# Patient Record
Sex: FEMALE | Race: WHITE
[De-identification: names, ages, dates, MRNs, and addresses within clinical notes are randomized per-mention and may not be internally consistent; named-entity substitution may affect disease eponyms.]

---

## 2019-12-06 ENCOUNTER — HOSPITAL ENCOUNTER (OUTPATIENT)
Dept: HOSPITAL 8 - CFH | Age: 39
Discharge: HOME | End: 2019-12-06
Attending: NURSE PRACTITIONER
Payer: COMMERCIAL

## 2019-12-06 DIAGNOSIS — K76.0: Primary | ICD-10-CM

## 2019-12-06 DIAGNOSIS — N28.1: ICD-10-CM

## 2019-12-06 DIAGNOSIS — D18.03: ICD-10-CM

## 2019-12-06 DIAGNOSIS — N20.0: ICD-10-CM

## 2019-12-06 PROCEDURE — 74170 CT ABD WO CNTRST FLWD CNTRST: CPT

## 2023-08-16 SDOH — ECONOMIC STABILITY: FOOD INSECURITY: WITHIN THE PAST 12 MONTHS, THE FOOD YOU BOUGHT JUST DIDN'T LAST AND YOU DIDN'T HAVE MONEY TO GET MORE.: NEVER TRUE

## 2023-08-16 SDOH — ECONOMIC STABILITY: TRANSPORTATION INSECURITY
IN THE PAST 12 MONTHS, HAS LACK OF TRANSPORTATION KEPT YOU FROM MEETINGS, WORK, OR FROM GETTING THINGS NEEDED FOR DAILY LIVING?: NO

## 2023-08-16 SDOH — ECONOMIC STABILITY: INCOME INSECURITY: IN THE LAST 12 MONTHS, WAS THERE A TIME WHEN YOU WERE NOT ABLE TO PAY THE MORTGAGE OR RENT ON TIME?: NO

## 2023-08-16 SDOH — ECONOMIC STABILITY: HOUSING INSECURITY
IN THE LAST 12 MONTHS, WAS THERE A TIME WHEN YOU DID NOT HAVE A STEADY PLACE TO SLEEP OR SLEPT IN A SHELTER (INCLUDING NOW)?: NO

## 2023-08-16 SDOH — ECONOMIC STABILITY: FOOD INSECURITY: WITHIN THE PAST 12 MONTHS, YOU WORRIED THAT YOUR FOOD WOULD RUN OUT BEFORE YOU GOT MONEY TO BUY MORE.: NEVER TRUE

## 2023-08-16 SDOH — HEALTH STABILITY: PHYSICAL HEALTH: ON AVERAGE, HOW MANY MINUTES DO YOU ENGAGE IN EXERCISE AT THIS LEVEL?: 60 MIN

## 2023-08-16 SDOH — ECONOMIC STABILITY: INCOME INSECURITY: HOW HARD IS IT FOR YOU TO PAY FOR THE VERY BASICS LIKE FOOD, HOUSING, MEDICAL CARE, AND HEATING?: NOT HARD AT ALL

## 2023-08-16 SDOH — HEALTH STABILITY: MENTAL HEALTH
STRESS IS WHEN SOMEONE FEELS TENSE, NERVOUS, ANXIOUS, OR CAN'T SLEEP AT NIGHT BECAUSE THEIR MIND IS TROUBLED. HOW STRESSED ARE YOU?: ONLY A LITTLE

## 2023-08-16 SDOH — HEALTH STABILITY: PHYSICAL HEALTH: ON AVERAGE, HOW MANY DAYS PER WEEK DO YOU ENGAGE IN MODERATE TO STRENUOUS EXERCISE (LIKE A BRISK WALK)?: 4 DAYS

## 2023-08-16 SDOH — ECONOMIC STABILITY: TRANSPORTATION INSECURITY
IN THE PAST 12 MONTHS, HAS THE LACK OF TRANSPORTATION KEPT YOU FROM MEDICAL APPOINTMENTS OR FROM GETTING MEDICATIONS?: NO

## 2023-08-16 SDOH — ECONOMIC STABILITY: HOUSING INSECURITY: IN THE LAST 12 MONTHS, HOW MANY PLACES HAVE YOU LIVED?: 1

## 2023-08-16 SDOH — ECONOMIC STABILITY: TRANSPORTATION INSECURITY
IN THE PAST 12 MONTHS, HAS LACK OF RELIABLE TRANSPORTATION KEPT YOU FROM MEDICAL APPOINTMENTS, MEETINGS, WORK OR FROM GETTING THINGS NEEDED FOR DAILY LIVING?: NO

## 2023-08-16 ASSESSMENT — SOCIAL DETERMINANTS OF HEALTH (SDOH)
HOW OFTEN DO YOU ATTENT MEETINGS OF THE CLUB OR ORGANIZATION YOU BELONG TO?: 1 TO 4 TIMES PER YEAR
IN A TYPICAL WEEK, HOW MANY TIMES DO YOU TALK ON THE PHONE WITH FAMILY, FRIENDS, OR NEIGHBORS?: THREE TIMES A WEEK
DO YOU BELONG TO ANY CLUBS OR ORGANIZATIONS SUCH AS CHURCH GROUPS UNIONS, FRATERNAL OR ATHLETIC GROUPS, OR SCHOOL GROUPS?: YES
HOW MANY DRINKS CONTAINING ALCOHOL DO YOU HAVE ON A TYPICAL DAY WHEN YOU ARE DRINKING: 1 OR 2
HOW OFTEN DO YOU ATTEND CHURCH OR RELIGIOUS SERVICES?: NEVER
DO YOU BELONG TO ANY CLUBS OR ORGANIZATIONS SUCH AS CHURCH GROUPS UNIONS, FRATERNAL OR ATHLETIC GROUPS, OR SCHOOL GROUPS?: YES
HOW OFTEN DO YOU ATTEND CHURCH OR RELIGIOUS SERVICES?: NEVER
HOW OFTEN DO YOU GET TOGETHER WITH FRIENDS OR RELATIVES?: ONCE A WEEK
HOW HARD IS IT FOR YOU TO PAY FOR THE VERY BASICS LIKE FOOD, HOUSING, MEDICAL CARE, AND HEATING?: NOT HARD AT ALL
HOW OFTEN DO YOU ATTENT MEETINGS OF THE CLUB OR ORGANIZATION YOU BELONG TO?: 1 TO 4 TIMES PER YEAR
ARE YOU MARRIED, WIDOWED, DIVORCED, SEPARATED, NEVER MARRIED, OR LIVING WITH A PARTNER?: NEVER MARRIED
IN A TYPICAL WEEK, HOW MANY TIMES DO YOU TALK ON THE PHONE WITH FAMILY, FRIENDS, OR NEIGHBORS?: THREE TIMES A WEEK
HOW OFTEN DO YOU HAVE SIX OR MORE DRINKS ON ONE OCCASION: NEVER
WITHIN THE PAST 12 MONTHS, YOU WORRIED THAT YOUR FOOD WOULD RUN OUT BEFORE YOU GOT THE MONEY TO BUY MORE: NEVER TRUE
HOW OFTEN DO YOU HAVE A DRINK CONTAINING ALCOHOL: MONTHLY OR LESS
ARE YOU MARRIED, WIDOWED, DIVORCED, SEPARATED, NEVER MARRIED, OR LIVING WITH A PARTNER?: NEVER MARRIED
HOW OFTEN DO YOU GET TOGETHER WITH FRIENDS OR RELATIVES?: ONCE A WEEK

## 2023-08-16 ASSESSMENT — LIFESTYLE VARIABLES
HOW OFTEN DO YOU HAVE SIX OR MORE DRINKS ON ONE OCCASION: NEVER
AUDIT-C TOTAL SCORE: 1
SKIP TO QUESTIONS 9-10: 1
HOW OFTEN DO YOU HAVE A DRINK CONTAINING ALCOHOL: MONTHLY OR LESS
HOW MANY STANDARD DRINKS CONTAINING ALCOHOL DO YOU HAVE ON A TYPICAL DAY: 1 OR 2

## 2023-08-17 ENCOUNTER — OFFICE VISIT (OUTPATIENT)
Dept: MEDICAL GROUP | Facility: CLINIC | Age: 43
End: 2023-08-17
Payer: COMMERCIAL

## 2023-08-17 ENCOUNTER — HOSPITAL ENCOUNTER (OUTPATIENT)
Dept: RADIOLOGY | Facility: MEDICAL CENTER | Age: 43
End: 2023-08-17
Payer: COMMERCIAL

## 2023-08-17 VITALS
WEIGHT: 162 LBS | HEIGHT: 62 IN | SYSTOLIC BLOOD PRESSURE: 88 MMHG | DIASTOLIC BLOOD PRESSURE: 68 MMHG | BODY MASS INDEX: 29.81 KG/M2 | TEMPERATURE: 98 F | HEART RATE: 92 BPM | OXYGEN SATURATION: 99 %

## 2023-08-17 DIAGNOSIS — K76.89 LIVER DYSFUNCTION: ICD-10-CM

## 2023-08-17 DIAGNOSIS — Z76.89 ESTABLISHING CARE WITH NEW DOCTOR, ENCOUNTER FOR: ICD-10-CM

## 2023-08-17 DIAGNOSIS — Z12.31 VISIT FOR SCREENING MAMMOGRAM: ICD-10-CM

## 2023-08-17 DIAGNOSIS — Z92.89 HISTORY OF MAMMOGRAPHY, SCREENING: ICD-10-CM

## 2023-08-17 DIAGNOSIS — Z30.09 BIRTH CONTROL COUNSELING: ICD-10-CM

## 2023-08-17 DIAGNOSIS — Z80.3 FAMILY HISTORY OF BREAST CANCER IN FIRST DEGREE RELATIVE: ICD-10-CM

## 2023-08-17 PROCEDURE — 99204 OFFICE O/P NEW MOD 45 MIN: CPT | Mod: GC

## 2023-08-17 PROCEDURE — 3074F SYST BP LT 130 MM HG: CPT

## 2023-08-17 PROCEDURE — 3078F DIAST BP <80 MM HG: CPT

## 2023-08-17 PROCEDURE — 77063 BREAST TOMOSYNTHESIS BI: CPT

## 2023-08-17 NOTE — ASSESSMENT & PLAN NOTE
Patient previously on hormonal birth control pill prior to her most recent pregnancy in 2019.  States that she experienced side effects with these pills and would not be interested in restarting.  Has expressed interest in a lower dose hormonal birth control method.  Does have hesitancy regarding IUDs.  However, states at this time that she would prefer to schedule a separate appointment to discuss birth control options in more detail.  Not currently sexually active.  Plan:  - Patient may call to schedule appointment to discuss birth control options at her earliest convenience.

## 2023-08-17 NOTE — PROGRESS NOTES
Subjective:     CC: Elevated liver function tests    HISTORY OF THE PRESENT ILLNESS: Patient is a 43 y.o. female. This pleasant patient is here today to establish care and discuss history of elevated liver function tests.     -Elevated liver enzymes: Patient reports history of elevated alkaline phosphatase and GGT over the past several years since her last pregnancy in 2019 (delivery induced at 38 weeks due to elevated liver enzymes and cholangitis). Follows with [facility]. Work-up so far has included liver ultrasound, CT A/P, liver biopsy. Reports a benign lesion noted on the liver from those imaging studies. States Liver U/S showed some fatty tissue but there was none noted in the biopsy. Specialist at the time was concerned for possible portal vein pathology.  On my chart review, most recent lab work from  showed elevated GGT at 313, mildly elevated AST at 45 with normal ALT at 63, Alk phos elevated at 157.denies abdominal pain, nausea, vomiting, history of significant alcohol use, IV drug use, history of hepatitis, jaundice, new itching.    No current [facility]-ordered outpatient medications on file.     No current [facility]-ordered facility-administered medications on file.     PMH: Iron deficiency  PSH:  with first child (). Rotator cuff repair 20 years ago.  Meds: N/A  Allergies: N/A  FamHx: T1DM, Hashimoto's, mother: cervical and breast cancer, leukemia father: CAD, HTN  T: N/A  A: Socially, once a month  D: N/A  Work: Teacher at [employer]  OB/Gyn: past 2 pap smears negative, was positive . 2022 most recent pap smear negative. , first pregnancy ended in miscarriage. First term pregnancy , other two V-vac. Bad reaction to epidural: Significant dizziness, nausea. Regular periods (every 3.5 weeks), no heavy bleeding or pain. Not currently sexually active.  Has been on birth control pill in the past, however did not tolerate side effects.    ROS:  "  Gen: no fevers/chills, no changes in weight  Eyes: no changes in vision  ENT: no changes in hearing  Pulm: no sob, no cough  CV: no chest pain, no palpitations  GI: no nausea/vomiting, no diarrhea  MSk: no myalgias  Skin: no rash  Neuro: no headaches, no numbness/tingling      Objective:     Exam: BP (!) 88/68 (BP Location: Left arm, Patient Position: Sitting, BP Cuff Size: Adult)   Pulse 92   Temp 36.7 °C (98 °F) (Temporal)   Ht 1.575 m (5' 2\")   Wt 73.5 kg (162 lb)   SpO2 99%  Body mass index is 29.63 kg/m².    General: Normal appearing. No distress.  HEENT: Normocephalic. Eyes conjunctiva clear lids without ptosis, pupils equal and reactive to light accommodation, ears normal shape and contour  Pulmonary: Clear to ausculation.  Normal effort. No rales, ronchi, or wheezing.  Cardiovascular: Regular rate and rhythm without murmur. Carotid and radial pulses are intact and equal bilaterally.  Abdomen: Soft, nontender, nondistended. Normal bowel sounds. Liver and spleen are not palpable  Neurologic: Grossly nonfocal  Skin: Warm and dry.  No obvious lesions.  Musculoskeletal: Normal gait. No obvious abnormalities.  No extremity cyanosis, clubbing, or edema.  Psych: Normal mood and affect. Alert and oriented x3. Judgment and insight is normal.    Labs: Most recent lab work from 6/2021 showed CBC within normal limits.  CMP from 2021 showed elevated alk phos, GGT, AST, all others within normal limits.    Assessment & Plan:   43 y.o. female with the following -    Liver dysfunction  Chronic condition, controlled. Patient followed closely by [facility] at [facility] regarding elevated alk phos, GGT.  Extensive work-up has been completed including liver ultrasound, CT abdomen pelvis, liver biopsy thus far with overall benign results according to patient.  Most recent visit was approximately 1 year ago, patient reports Dr. Moser had ordered lab work to be done at that time.  Plan:  - Discussed with patient " that lab work ordered from her GI specialist should still be in active orders as it has not yet been a year since they were ordered.  Also discussed with her that her GI specialist most likely had specific testing that they have ordered and we would defer to them for testing.  Recommended patient to complete lab work ordered by GI doctor and when completed contact their office for an appointment.  -Patient currently asymptomatic, gave return precautions including increased abdominal pain or distention, changes in bowel habits, nausea, vomiting.  - Gave patient information on genetic research study on [employer]    Family history of breast cancer in first degree relative  Patient with family history of breast cancer (her mother).  Has undergone yearly mammograms for surveillance, most recently 5/2022.  So far have been negative.  Plan:  - Gave patient instructions on scheduling mammogram, will forward results to me as her PCP    Birth control counseling  Patient previously on hormonal birth control pill prior to her most recent pregnancy in 2019.  States that she experienced side effects with these pills and would not be interested in restarting.  Has expressed interest in a lower dose hormonal birth control method.  Does have hesitancy regarding IUDs.  However, states at this time that she would prefer to schedule a separate appointment to discuss birth control options in more detail.  Not currently sexually active.  Plan:  - Patient may call to schedule appointment to discuss birth control options at her earliest convenience.    Return in about 1 year (around 8/17/2024) for annual wellness visit.    Genevieve Cornejo M.D.    PGY-1  [facility]

## 2023-08-17 NOTE — ASSESSMENT & PLAN NOTE
Patient with family history of breast cancer (her mother).  Has undergone yearly mammograms for surveillance, most recently 5/2022.  So far have been negative.  Plan:  - Gave patient instructions on scheduling mammogram, will forward results to me as her PCP

## 2023-08-17 NOTE — ASSESSMENT & PLAN NOTE
Chronic condition, controlled. Patient followed closely by Dr. [facility] at [facility] regarding elevated alk phos, GGT.  Extensive work-up has been completed including liver ultrasound, CT abdomen pelvis, liver biopsy thus far with overall benign results according to patient.  Most recent visit was approximately 1 year ago, patient reports Dr. Moser had ordered lab work to be done at that time.  Plan:  - Discussed with patient that lab work ordered from her GI specialist should still be in active orders as it has not yet been a year since they were ordered.  Also discussed with her that her GI specialist most likely had specific testing that they have ordered and we would defer to them for testing.  Recommended patient to complete lab work ordered by GI doctor and when completed contact their office for an appointment.  -Patient currently asymptomatic, gave return precautions including increased abdominal pain or distention, changes in bowel habits, nausea, vomiting.  - Gave patient information on genetic research study on [employer]

## 2023-08-22 ENCOUNTER — HOSPITAL ENCOUNTER (OUTPATIENT)
Dept: RADIOLOGY | Facility: MEDICAL CENTER | Age: 43
End: 2023-08-22
Payer: COMMERCIAL

## 2023-08-22 ENCOUNTER — HOSPITAL ENCOUNTER (OUTPATIENT)
Dept: RADIOLOGY | Facility: MEDICAL CENTER | Age: 43
End: 2023-08-22
Attending: NURSE PRACTITIONER
Payer: COMMERCIAL

## 2023-08-25 ENCOUNTER — RESEARCH ENCOUNTER (OUTPATIENT)
Dept: RESEARCH | Facility: WORKSITE | Age: 43
End: 2023-08-25
Payer: COMMERCIAL

## 2023-08-25 DIAGNOSIS — Z00.6 RESEARCH STUDY PATIENT: ICD-10-CM

## 2023-08-25 NOTE — RESEARCH NOTE
Virtual appointment completed to enroll patient into [facility] [employer]. Consent forms signed and onsite collection has been scheduled.     Confirmed with the participant which designated provider they would like study results shared with Genevieve Cornejo M.D. Patient will have an opportunity to share the results with any providers of their choosing in the future by accessing their results from [email or web address]

## 2023-08-28 ENCOUNTER — HOSPITAL ENCOUNTER (OUTPATIENT)
Dept: LAB | Facility: MEDICAL CENTER | Age: 43
End: 2023-08-28
Payer: COMMERCIAL

## 2023-08-28 DIAGNOSIS — Z00.6 RESEARCH STUDY PATIENT: ICD-10-CM

## 2023-08-31 LAB
ELF SCORE: 7.85 PPM (ref 9.8–11.3)
RELATIVE RISK: NORMAL
RISK GROUP: NORMAL
RISK: 3.3 %

## 2023-10-15 LAB
APOB+LDLR+PCSK9 GENE MUT ANL BLD/T: NOT DETECTED
BRCA1+BRCA2 DEL+DUP + FULL MUT ANL BLD/T: NOT DETECTED
MLH1+MSH2+MSH6+PMS2 GN DEL+DUP+FUL M: NOT DETECTED

## 2024-02-07 ENCOUNTER — OFFICE VISIT (OUTPATIENT)
Dept: URGENT CARE | Facility: CLINIC | Age: 44
End: 2024-02-07
Payer: COMMERCIAL

## 2024-02-07 ENCOUNTER — APPOINTMENT (OUTPATIENT)
Dept: RADIOLOGY | Facility: IMAGING CENTER | Age: 44
End: 2024-02-07
Payer: COMMERCIAL

## 2024-02-07 ENCOUNTER — APPOINTMENT (OUTPATIENT)
Dept: TELEHEALTH | Facility: TELEMEDICINE | Age: 44
End: 2024-02-07
Payer: COMMERCIAL

## 2024-02-07 VITALS
DIASTOLIC BLOOD PRESSURE: 74 MMHG | SYSTOLIC BLOOD PRESSURE: 124 MMHG | WEIGHT: 160.4 LBS | BODY MASS INDEX: 29.52 KG/M2 | HEART RATE: 91 BPM | RESPIRATION RATE: 20 BRPM | TEMPERATURE: 98.2 F | OXYGEN SATURATION: 100 % | HEIGHT: 62 IN

## 2024-02-07 DIAGNOSIS — M25.511 ACUTE PAIN OF RIGHT SHOULDER: ICD-10-CM

## 2024-02-07 DIAGNOSIS — R07.81 PLEURITIC PAIN: ICD-10-CM

## 2024-02-07 DIAGNOSIS — S46.911A STRAIN OF RIGHT SHOULDER, INITIAL ENCOUNTER: ICD-10-CM

## 2024-02-07 DIAGNOSIS — S46.919A: ICD-10-CM

## 2024-02-07 PROCEDURE — 71046 X-RAY EXAM CHEST 2 VIEWS: CPT | Mod: TC | Performed by: RADIOLOGY

## 2024-02-07 PROCEDURE — 3074F SYST BP LT 130 MM HG: CPT

## 2024-02-07 PROCEDURE — 73030 X-RAY EXAM OF SHOULDER: CPT | Mod: TC,RT | Performed by: RADIOLOGY

## 2024-02-07 PROCEDURE — 99203 OFFICE O/P NEW LOW 30 MIN: CPT

## 2024-02-07 PROCEDURE — 3078F DIAST BP <80 MM HG: CPT

## 2024-02-07 RX ORDER — MELOXICAM 15 MG/1
15 TABLET ORAL DAILY
Qty: 30 TABLET | Refills: 0 | Status: SHIPPED | OUTPATIENT
Start: 2024-02-07

## 2024-02-09 ASSESSMENT — ENCOUNTER SYMPTOMS
FEVER: 0
TINGLING: 0
DIZZINESS: 0
SENSORY CHANGE: 0
SHORTNESS OF BREATH: 0
FALLS: 0
VOMITING: 0
WEAKNESS: 0
NAUSEA: 0
NECK PAIN: 1

## 2024-02-09 NOTE — PROGRESS NOTES
"Subjective     Rubi Powell is a 43 y.o. female who presents with neck stiffness, right shoulder blade and shoulder pain x4 days.     HPI:   Rubi is a 44yo female presenting for right shoulder blade pain with associated right-sided neck stiffness and right shoulder pain x4 days. Reports a prior dislocated rib which presented with pain similar in nature to what she is currently experiencing. Reports prior right shoulder surgery. Denies recent injury. Pain is constant and tight in nature, aggravate by movement. Denies numbness/tingling or sensation loss in right upper extremity. No swelling or discoloration. Reports tenderness to palpation of right scapular region. She has full ROM of the neck and right shoulder. No lumbar, elbow, or wrist involvement. Denies shortness of breath or airway involvement. No fever.       Review of Systems   Constitutional:  Negative for fever.   Respiratory:  Negative for shortness of breath.    Cardiovascular:  Negative for chest pain.   Gastrointestinal:  Negative for nausea and vomiting.   Musculoskeletal:  Positive for joint pain and neck pain. Negative for falls.   Neurological:  Negative for dizziness, tingling, sensory change and weakness.     History reviewed. No pertinent past medical history.     History reviewed. No pertinent surgical history.     Allergies: Penicillins     Medications, Allergies, and current problem list reviewed today in Epic.      Objective     /74 (BP Location: Left arm, Patient Position: Sitting)   Pulse 91   Temp 36.8 °C (98.2 °F) (Temporal)   Resp 20   Ht 1.575 m (5' 2\")   Wt 72.8 kg (160 lb 6.4 oz)   LMP 01/07/2024 (Exact Date)   SpO2 100%   BMI 29.34 kg/m²      Physical Exam  Vitals reviewed.   Constitutional:       General: She is not in acute distress.  Eyes:      Extraocular Movements: Extraocular movements intact.      Conjunctiva/sclera: Conjunctivae normal.      Pupils: Pupils are equal, round, and reactive to light.   Neck:      " Trachea: Trachea normal.      Meningeal: Brudzinski's sign and Kernig's sign absent.      Comments: Tenderness to palpation to posterior right cervical musculature.   Strength 5/5 to bilateral upper extremities.   No midline tenderness.       Cervical ROM intact.    Cardiovascular:      Rate and Rhythm: Normal rate.      Pulses: Normal pulses.           Radial pulses are 2+ on the right side and 2+ on the left side.   Pulmonary:      Effort: Pulmonary effort is normal. No tachypnea, accessory muscle usage, prolonged expiration, respiratory distress or retractions.   Musculoskeletal:      Right shoulder: Tenderness present. No swelling, deformity, bony tenderness or crepitus. Normal range of motion. Normal strength. Normal pulse.      Left shoulder: Normal.      Right upper arm: No swelling, deformity, tenderness or bony tenderness.      Left upper arm: Normal.      Right elbow: No swelling or deformity. Normal range of motion. No tenderness.      Left elbow: Normal.      Right forearm: No swelling, deformity, tenderness or bony tenderness.      Left forearm: Normal.      Right wrist: No swelling, deformity, tenderness, bony tenderness, snuff box tenderness or crepitus. Normal range of motion. Normal pulse.      Left wrist: Normal.      Cervical back: Normal range of motion and neck supple. No erythema, rigidity or crepitus. Pain with movement and muscular tenderness present. No spinous process tenderness. Normal range of motion.      Thoracic back: No deformity or bony tenderness. Normal range of motion.      Lumbar back: No deformity, tenderness or bony tenderness. Normal range of motion.      Comments: Tenderness to palpation to right scapular region. No crepitus. No obvious rib misalignment with palpation.       Skin:     General: Skin is warm and dry.   Neurological:      Mental Status: She is alert. Mental status is at baseline.   Psychiatric:         Mood and Affect: Mood normal.         Behavior: Behavior  normal.         Thought Content: Thought content normal.       DX-CHEST-2 VIEWS    Result Date: 2/7/2024 2/7/2024 4:29 PM HISTORY/REASON FOR EXAM:  RIGHT chest/shoulder pain. TECHNIQUE/EXAM DESCRIPTION AND NUMBER OF VIEWS: Two views of the chest. COMPARISON:  None FINDINGS: Cardiomediastinal contour is within normal limits. No focal pulmonary consolidation. No pleural fluid collection or pneumothorax. No major bony abnormality is seen. Postoperative change of RIGHT shoulder.     No acute cardiopulmonary disease.    DX-SHOULDER 2+ RIGHT    Result Date: 2/7/2024  2/7/2024 4:29 PM HISTORY/REASON FOR EXAM:  Atraumatic Pain/Swelling/Deformity. RIGHT scapular pain. TECHNIQUE/EXAM DESCRIPTION AND NUMBER OF VIEWS:  3 views of the RIGHT shoulder. COMPARISON: None FINDINGS: Clavicle is intact.  AC joint is preserved. Visualized proximal humerus is intact and normally located. Postoperative change of the glenoid with multiple suture anchors present. Visualized RIGHT chest is unremarkable.     1.  No fracture or dislocation of RIGHT shoulder. 2.  Postoperative change suggesting prior rotator cuff repair.      Assessment & Plan     1. Pleuritic pain   - DX-CHEST-2 VIEWS; Future    2. Acute pain of right shoulder   - DX-SHOULDER 2+ RIGHT; Future    3. Muscle strain of scapular region, initial encounter   - meloxicam (MOBIC) 15 MG tablet; Take 1 Tablet by mouth every day.  Dispense: 30 Tablet; Refill: 0       MDM/Comments:   History and examination with reproducible tenderness to palpation of posterior right cervical musculature and right scapular region consistent with acute strain of neck muscle, muscle strain of scapular region, and strain of right shoulder. No red flag/alarm features present that warrant immediate higher level of care or further work up. Pulmonary examination without abnormality, no dyspnea or shortness of breath. Chest x-ray without major bony abnormality or pneumothorax. Shoulder x-ray without evidence of  fracture or dislocation.             Discussed with patient signs and symptoms consistent with muscle strain. Patient advised on how to correctly take meloxicam. No additional NSAIDs while taking this medication. May use Tylenol for breakthrough pain.   Treatment of as above and initial rest with no heavy lifting, stooping, or strenuous activity. Massage, ice and/or heat which ever feels better. Encouraged walking, stretching, and range of motion exercises as tolerated. Avoid sitting or laying down for long periods of time except for at night during sleep.   Patient is overall very well-appearing, no acute distress, and normal vital signs. Suspicions for acute emergent pathology are low.      Illness progression and alarm symptoms discussed with patient, emphasizing low threshold for returning to clinic/emergency department for worsening symptoms. Patient is agreeable to the plan and verbalizes understanding, and will follow up if warranted.        Differential diagnosis, natural history, supportive care, and indications for immediate follow-up discussed.       Follow-up as needed if symptoms worsen or fail to improve to PCP, Urgent care or Emergency Room.                      Electronically signed by KVNG Tobin

## 2024-03-06 ENCOUNTER — APPOINTMENT (OUTPATIENT)
Dept: MEDICAL GROUP | Facility: CLINIC | Age: 44
End: 2024-03-06
Payer: COMMERCIAL

## 2024-05-29 ENCOUNTER — RESEARCH ENCOUNTER (OUTPATIENT)
Dept: RESEARCH | Facility: MEDICAL CENTER | Age: 44
End: 2024-05-29
Payer: COMMERCIAL

## 2024-09-14 SDOH — ECONOMIC STABILITY: FOOD INSECURITY: WITHIN THE PAST 12 MONTHS, YOU WORRIED THAT YOUR FOOD WOULD RUN OUT BEFORE YOU GOT MONEY TO BUY MORE.: NEVER TRUE

## 2024-09-14 SDOH — ECONOMIC STABILITY: INCOME INSECURITY: IN THE LAST 12 MONTHS, WAS THERE A TIME WHEN YOU WERE NOT ABLE TO PAY THE MORTGAGE OR RENT ON TIME?: NO

## 2024-09-14 SDOH — ECONOMIC STABILITY: INCOME INSECURITY: HOW HARD IS IT FOR YOU TO PAY FOR THE VERY BASICS LIKE FOOD, HOUSING, MEDICAL CARE, AND HEATING?: NOT HARD AT ALL

## 2024-09-14 SDOH — ECONOMIC STABILITY: FOOD INSECURITY: WITHIN THE PAST 12 MONTHS, THE FOOD YOU BOUGHT JUST DIDN'T LAST AND YOU DIDN'T HAVE MONEY TO GET MORE.: NEVER TRUE

## 2024-09-14 SDOH — HEALTH STABILITY: PHYSICAL HEALTH: ON AVERAGE, HOW MANY MINUTES DO YOU ENGAGE IN EXERCISE AT THIS LEVEL?: 10 MIN

## 2024-09-14 SDOH — HEALTH STABILITY: PHYSICAL HEALTH: ON AVERAGE, HOW MANY DAYS PER WEEK DO YOU ENGAGE IN MODERATE TO STRENUOUS EXERCISE (LIKE A BRISK WALK)?: 3 DAYS

## 2024-09-14 ASSESSMENT — SOCIAL DETERMINANTS OF HEALTH (SDOH)
DO YOU BELONG TO ANY CLUBS OR ORGANIZATIONS SUCH AS CHURCH GROUPS UNIONS, FRATERNAL OR ATHLETIC GROUPS, OR SCHOOL GROUPS?: YES
HOW OFTEN DO YOU GET TOGETHER WITH FRIENDS OR RELATIVES?: MORE THAN THREE TIMES A WEEK
HOW OFTEN DO YOU ATTEND CHURCH OR RELIGIOUS SERVICES?: NEVER
IN A TYPICAL WEEK, HOW MANY TIMES DO YOU TALK ON THE PHONE WITH FAMILY, FRIENDS, OR NEIGHBORS?: TWICE A WEEK
HOW OFTEN DO YOU ATTENT MEETINGS OF THE CLUB OR ORGANIZATION YOU BELONG TO?: MORE THAN 4 TIMES PER YEAR
ARE YOU MARRIED, WIDOWED, DIVORCED, SEPARATED, NEVER MARRIED, OR LIVING WITH A PARTNER?: NEVER MARRIED
WITHIN THE PAST 12 MONTHS, YOU WORRIED THAT YOUR FOOD WOULD RUN OUT BEFORE YOU GOT THE MONEY TO BUY MORE: NEVER TRUE
HOW MANY DRINKS CONTAINING ALCOHOL DO YOU HAVE ON A TYPICAL DAY WHEN YOU ARE DRINKING: 1 OR 2
DO YOU BELONG TO ANY CLUBS OR ORGANIZATIONS SUCH AS CHURCH GROUPS UNIONS, FRATERNAL OR ATHLETIC GROUPS, OR SCHOOL GROUPS?: YES
IN THE PAST 12 MONTHS, HAS THE ELECTRIC, GAS, OIL, OR WATER COMPANY THREATENED TO SHUT OFF SERVICE IN YOUR HOME?: NO
HOW OFTEN DO YOU HAVE A DRINK CONTAINING ALCOHOL: 2-4 TIMES A MONTH
IN A TYPICAL WEEK, HOW MANY TIMES DO YOU TALK ON THE PHONE WITH FAMILY, FRIENDS, OR NEIGHBORS?: TWICE A WEEK
HOW OFTEN DO YOU ATTEND CHURCH OR RELIGIOUS SERVICES?: NEVER
HOW OFTEN DO YOU GET TOGETHER WITH FRIENDS OR RELATIVES?: MORE THAN THREE TIMES A WEEK
HOW HARD IS IT FOR YOU TO PAY FOR THE VERY BASICS LIKE FOOD, HOUSING, MEDICAL CARE, AND HEATING?: NOT HARD AT ALL
HOW OFTEN DO YOU HAVE SIX OR MORE DRINKS ON ONE OCCASION: NEVER
HOW OFTEN DO YOU ATTENT MEETINGS OF THE CLUB OR ORGANIZATION YOU BELONG TO?: MORE THAN 4 TIMES PER YEAR
ARE YOU MARRIED, WIDOWED, DIVORCED, SEPARATED, NEVER MARRIED, OR LIVING WITH A PARTNER?: NEVER MARRIED

## 2024-09-14 ASSESSMENT — LIFESTYLE VARIABLES
HOW OFTEN DO YOU HAVE SIX OR MORE DRINKS ON ONE OCCASION: NEVER
SKIP TO QUESTIONS 9-10: 1
AUDIT-C TOTAL SCORE: 2
HOW MANY STANDARD DRINKS CONTAINING ALCOHOL DO YOU HAVE ON A TYPICAL DAY: 1 OR 2
HOW OFTEN DO YOU HAVE A DRINK CONTAINING ALCOHOL: 2-4 TIMES A MONTH

## 2024-09-17 ENCOUNTER — HOSPITAL ENCOUNTER (OUTPATIENT)
Dept: RADIOLOGY | Facility: MEDICAL CENTER | Age: 44
End: 2024-09-17
Attending: NURSE PRACTITIONER
Payer: COMMERCIAL

## 2024-09-17 DIAGNOSIS — Z12.31 VISIT FOR SCREENING MAMMOGRAM: ICD-10-CM

## 2024-09-17 PROCEDURE — 77067 SCR MAMMO BI INCL CAD: CPT

## 2024-09-18 ENCOUNTER — OFFICE VISIT (OUTPATIENT)
Dept: MEDICAL GROUP | Facility: MEDICAL CENTER | Age: 44
End: 2024-09-18
Payer: COMMERCIAL

## 2024-09-18 VITALS
HEART RATE: 99 BPM | SYSTOLIC BLOOD PRESSURE: 122 MMHG | TEMPERATURE: 98.6 F | WEIGHT: 164 LBS | DIASTOLIC BLOOD PRESSURE: 68 MMHG | BODY MASS INDEX: 30.18 KG/M2 | OXYGEN SATURATION: 99 % | HEIGHT: 62 IN

## 2024-09-18 DIAGNOSIS — Z11.4 SCREENING FOR HIV (HUMAN IMMUNODEFICIENCY VIRUS): ICD-10-CM

## 2024-09-18 DIAGNOSIS — Z23 NEED FOR VACCINATION: ICD-10-CM

## 2024-09-18 DIAGNOSIS — Z30.09 BIRTH CONTROL COUNSELING: ICD-10-CM

## 2024-09-18 DIAGNOSIS — Z30.9 ENCOUNTER FOR CONTRACEPTIVE MANAGEMENT, UNSPECIFIED TYPE: ICD-10-CM

## 2024-09-18 DIAGNOSIS — Z00.00 PE (PHYSICAL EXAM), ANNUAL: ICD-10-CM

## 2024-09-18 DIAGNOSIS — Z11.3 ROUTINE SCREENING FOR STI (SEXUALLY TRANSMITTED INFECTION): ICD-10-CM

## 2024-09-18 DIAGNOSIS — R74.8 ELEVATED LIVER ENZYMES: ICD-10-CM

## 2024-09-18 DIAGNOSIS — Z11.59 NEED FOR HEPATITIS C SCREENING TEST: ICD-10-CM

## 2024-09-18 LAB
POCT INT CON NEG: NEGATIVE
POCT INT CON POS: POSITIVE
POCT URINE PREGNANCY TEST: NEGATIVE

## 2024-09-18 PROCEDURE — 3078F DIAST BP <80 MM HG: CPT | Performed by: NURSE PRACTITIONER

## 2024-09-18 PROCEDURE — 99214 OFFICE O/P EST MOD 30 MIN: CPT | Mod: 25 | Performed by: NURSE PRACTITIONER

## 2024-09-18 PROCEDURE — 81025 URINE PREGNANCY TEST: CPT | Performed by: NURSE PRACTITIONER

## 2024-09-18 PROCEDURE — 99396 PREV VISIT EST AGE 40-64: CPT | Mod: 25 | Performed by: NURSE PRACTITIONER

## 2024-09-18 PROCEDURE — 90715 TDAP VACCINE 7 YRS/> IM: CPT | Performed by: NURSE PRACTITIONER

## 2024-09-18 PROCEDURE — 90471 IMMUNIZATION ADMIN: CPT | Performed by: NURSE PRACTITIONER

## 2024-09-18 PROCEDURE — 3074F SYST BP LT 130 MM HG: CPT | Performed by: NURSE PRACTITIONER

## 2024-09-18 RX ORDER — LEVONORGESTREL AND ETHINYL ESTRADIOL 90; 20 UG/1; UG/1
1 TABLET ORAL DAILY
Qty: 90 TABLET | Refills: 0 | Status: SHIPPED | OUTPATIENT
Start: 2024-09-18

## 2024-09-18 ASSESSMENT — ENCOUNTER SYMPTOMS
WEIGHT LOSS: 0
EYE PAIN: 0
HEADACHES: 0
INSOMNIA: 0
TREMORS: 0
SPUTUM PRODUCTION: 0
BRUISES/BLEEDS EASILY: 0
FLANK PAIN: 0
BLOOD IN STOOL: 0
COUGH: 0
WEAKNESS: 0
SENSORY CHANGE: 0
DEPRESSION: 0
SPEECH CHANGE: 0
WHEEZING: 0
SHORTNESS OF BREATH: 0
SORE THROAT: 0
PALPITATIONS: 0
SINUS PAIN: 0
BACK PAIN: 0
VOMITING: 0
DIZZINESS: 0
CONSTIPATION: 0
ABDOMINAL PAIN: 0
NERVOUS/ANXIOUS: 0
POLYDIPSIA: 0
FEVER: 0
DIARRHEA: 0
FOCAL WEAKNESS: 0
EYE DISCHARGE: 0
EYE REDNESS: 0
LOSS OF CONSCIOUSNESS: 0
NAUSEA: 0
MYALGIAS: 0
CHILLS: 0

## 2024-09-18 ASSESSMENT — PATIENT HEALTH QUESTIONNAIRE - PHQ9: CLINICAL INTERPRETATION OF PHQ2 SCORE: 0

## 2024-09-18 NOTE — PROGRESS NOTES
Patient agreed to using ERIK: yes    Rubi was seen today for establish care.    Diagnoses and all orders for this visit:    Elevated liver enzymes    Encounter for contraceptive management, unspecified type  -     Referral to OB/Gyn  -     levonorgestrel-ethinyl estradiol (LYBREL) 90-20 MCG per tablet; Take 1 Tablet by mouth every day.  -     POCT Pregnancy    Birth control counseling  -     Referral to OB/Gyn  -     POCT Pregnancy  -     ESTRADIOL; Future  -     LUTEINIZING HORMONE SERUM; Future  -     PROGESTERONE; Future  -     PROLACTIN; Future  -     FSH; Future    PE (physical exam), annual  -     CBC WITHOUT DIFFERENTIAL; Future  -     Comp Metabolic Panel; Future  -     Lipid Profile; Future  -     TSH; Future  -     T3 FREE; Future  -     FREE THYROXINE; Future  -     VITAMIN D,25 HYDROXY (DEFICIENCY); Future  -     HEMOGLOBIN A1C; Future    Screening for HIV (human immunodeficiency virus)  -     HIV AG/AB COMBO ASSAY SCREENING; Future    Need for hepatitis C screening test  -     HEP C VIRUS ANTIBODY; Future    Need for vaccination  -     Tdap Vaccine =>8YO IM    Routine screening for STI (sexually transmitted infection)  -     Chlamydia/GC, PCR (Urine); Future              Assessment & Plan  1. Establishment of care.  2. Annual physical exam  Her vital signs are within normal limits.   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 She is advised to increase her electrolyte and water intake.    3. Birth control  She has expressed interest in Gisela birth control pills. A prescription for Gisela birth control pills will be provided for a 3-month trial period. A pregnancy test will be conducted today. A referral to OB/GYN will be made for a birth control consultation. If she decides to continue with the current birth control method and is satisfied with the medication, it will be continued. If she prefers the options offered by the OB/GYN,  they will initiate the treatment.     4. Family history of Hashimoto's.  Given her family history of Hashimoto's, a thyroid function test will be ordered.    5. Elevated liver enzymes.  Per pt history, her liver enzymes have remained stable, and there is no concern from the GI perspective. She does not exhibit any symptoms. A liver function test will be ordered to monitor her liver enzymes.    6. Health Maintenance.  Her recent mammogram on 09/17/2024 was benign. She has not received the tetanus vaccine; it will be administered today. Colon cancer screening will be discussed at her next annual visit. Tests for chlamydia and gonorrhea will be conducted.     Follow-up  She will follow up in 4 to 6 weeks.    History of Present Illness  The patient presents for birth control and to establish care.    She is considering birth control as an alternative to hormone therapy as she approaches menopause. She has previously used estrogen and progesterone pills effectively for birth control and switched to progestin-only pills after childbirth. She has no contraindications to estrogen and has used estrogen birth control in the past without issues. She is interested in a daily pill that could potentially eliminate her periods for a year. She is not interested in an IUD due to concerns about potential uterine damage. Her menstrual cycles are regular. Her last Pap smear was normal, following a previous abnormal result.    She underwent a mammogram yesterday, which showed no abnormalities. Despite her mother's history of breast and cervical cancer, genetic testing revealed no inherited risk.    She reports no shortness of breath, dizziness, or blood in stool or urine. 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 She has tried intermittent fasting but stopped due to lightheadedness, which she attributes to dehydration. She reports no changes in vision and tries to use sunscreen regularly. She has  no concerns about sexually transmitted infections. She has not had a urinary tract infection in the past 6 months. She had a heart murmur as a child but has not experienced it since. She does not have difficulty swallowing, acid reflux, epigastric pain, metallic taste in the mouth, or esophageal pain.    She has been seen at urgent care for elevated liver enzymes, specifically GGT and another enzyme. A liver biopsy and genetic testing for CLEMONS were both normal. She has been under the care of a gastroenterologist and has no symptoms of jaundice, pain, or digestive issues. The cause of her elevated liver enzymes remains unknown. She was advised to lose weight and reduce carbohydrate intake. A biopsy confirmed that she does not have fatty liver disease.    She had shoulder surgery at age 22 due to a rotator cuff injury where the ligament was pulled off the cuff area. She has also had wisdom teeth extraction and a .    SOCIAL HISTORY  She denies smoking, alcohol, or drug use. 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    FAMILY HISTORY  Her mother had breast cancer and cervical cancer. Her father and both grandmothers had high blood pressure. Her father had a heart attack and has cardiac issues. Her sister has Hashimoto's. Two of her nephews have celiac and one nephew has diabetes. Her father has sleep apnea.    IMMUNIZATIONS  She has received the first 2 COVID-19 vaccines but has not done the boosters. She does not usually get the influenza vaccine. She is up-to-date on other vaccines except tetanus.       Ob-Gyn/ History:    Last Pap Smear:  .   Health Maintenance  Below Anticipatory guidance discussed with patient  Cholesterol Screening: labs  Diabetes Screening: labs  Aspirin Use: no    Diet: regular   Exercise: active   Substance Abuse: no  Safe in relationship.   Seat belts, bike helmet, gun safety discussed.  Sun protection used.    Cancer screening  Cervical Cancer  Screenin    Breast Cancer Screenin24    Infectious disease screening/Immunizations  --STI Screening: UA, labs   --Practices safe sex.  --HIV Screening: labs   --Hepatitis C Screening: labs   --Immunizations: Tdap     Review of Systems   Constitutional:  Negative for chills, fever, malaise/fatigue and weight loss.   HENT:  Negative for congestion, ear discharge, hearing loss, sinus pain and sore throat.    Eyes:  Negative for pain, discharge and redness.   Respiratory:  Negative for cough, sputum production, shortness of breath and wheezing.    Cardiovascular:  Negative for chest pain, palpitations and leg swelling.   Gastrointestinal:  Negative for abdominal pain, blood in stool, constipation, diarrhea, nausea and vomiting.   Genitourinary:  Negative for dysuria, flank pain, frequency, hematuria and urgency.   Musculoskeletal:  Negative for back pain, joint pain and myalgias.   Skin:  Negative for itching and rash.   Neurological:  Negative for dizziness, tremors, sensory change, speech change, focal weakness, loss of consciousness, weakness and headaches.   Endo/Heme/Allergies:  Negative for environmental allergies and polydipsia. Does not bruise/bleed easily.   Psychiatric/Behavioral:  Negative for depression. The patient is not nervous/anxious and does not have insomnia.         He  has no past medical history on file.  He  has a past surgical history that includes primary c section and arthroplasty.  Family History   Problem Relation Age of Onset    Heart Disease Father     Hypertension Father      Social History     Tobacco Use    Smoking status: Never    Smokeless tobacco: Never   Substance Use Topics    Alcohol use: Yes     Comment: once a month    Drug use: Never     Patient Active Problem List    Diagnosis Date Noted    Elevated liver enzymes 2024    Liver dysfunction 2023    Establishing care with new doctor, encounter for 2023    Family history of breast cancer in first  "degree relative 08/17/2023    Birth control counseling 08/17/2023     Current Outpatient Medications   Medication Sig Dispense Refill    levonorgestrel-ethinyl estradiol (LYBREL) 90-20 MCG per tablet Take 1 Tablet by mouth every day. 90 Tablet 0    meloxicam (MOBIC) 15 MG tablet Take 1 Tablet by mouth every day. 30 Tablet 0     No current facility-administered medications for this visit.    (including changes today)  Allergies: Penicillins    /68 (BP Location: Left arm, Patient Position: Sitting, BP Cuff Size: Adult)   Pulse 99   Temp 37 °C (98.6 °F) (Temporal)   Ht 1.575 m (5' 2\")   Wt 74.4 kg (164 lb)   SpO2 99%      Physical Exam  Constitutional:       General: She is not in acute distress.     Appearance: Normal appearance. She is normal weight. She is not ill-appearing.   HENT:      Head: Normocephalic and atraumatic.      Right Ear: Tympanic membrane, ear canal and external ear normal. There is no impacted cerumen.      Left Ear: Tympanic membrane, ear canal and external ear normal. There is no impacted cerumen.      Nose: Nose normal. No congestion or rhinorrhea.      Mouth/Throat:      Mouth: Mucous membranes are moist.      Pharynx: No oropharyngeal exudate or posterior oropharyngeal erythema.   Eyes:      General:         Right eye: No discharge.         Left eye: No discharge.      Pupils: Pupils are equal, round, and reactive to light.   Cardiovascular:      Rate and Rhythm: Normal rate.      Pulses: Normal pulses.      Heart sounds: Normal heart sounds. No murmur heard.     No friction rub. No gallop.   Pulmonary:      Effort: Pulmonary effort is normal. No respiratory distress.      Breath sounds: Normal breath sounds. No wheezing, rhonchi or rales.   Abdominal:      General: Bowel sounds are normal. There is no distension.      Palpations: Abdomen is soft. There is no mass.      Tenderness: There is no abdominal tenderness. There is no right CVA tenderness, left CVA tenderness, guarding or " rebound.      Hernia: No hernia is present.   Musculoskeletal:         General: No swelling, tenderness or deformity. Normal range of motion.      Cervical back: Normal range of motion and neck supple.      Right lower leg: No edema.      Left lower leg: No edema.   Lymphadenopathy:      Cervical: No cervical adenopathy.   Skin:     General: Skin is warm.      Findings: No rash.   Neurological:      General: No focal deficit present.      Mental Status: She is alert. Mental status is at baseline.      Cranial Nerves: No cranial nerve deficit.      Sensory: No sensory deficit.      Motor: No weakness.      Coordination: Coordination normal.      Gait: Gait normal.   Psychiatric:         Mood and Affect: Mood normal.         Behavior: Behavior normal.          Results  Laboratory Studies  Liver enzymes were high, but liver biopsy came back fine. Genetic testing for CLEMONS also came back healthy.    Imaging  Mammogram results were benign.       No follow-ups on file. 8 wks

## 2024-09-27 ENCOUNTER — HOSPITAL ENCOUNTER (OUTPATIENT)
Dept: LAB | Facility: MEDICAL CENTER | Age: 44
End: 2024-09-27
Attending: NURSE PRACTITIONER
Payer: COMMERCIAL

## 2024-09-27 DIAGNOSIS — Z00.00 PE (PHYSICAL EXAM), ANNUAL: ICD-10-CM

## 2024-09-27 DIAGNOSIS — Z11.3 ROUTINE SCREENING FOR STI (SEXUALLY TRANSMITTED INFECTION): ICD-10-CM

## 2024-09-27 DIAGNOSIS — Z11.4 SCREENING FOR HIV (HUMAN IMMUNODEFICIENCY VIRUS): ICD-10-CM

## 2024-09-27 DIAGNOSIS — Z11.59 NEED FOR HEPATITIS C SCREENING TEST: ICD-10-CM

## 2024-09-27 DIAGNOSIS — Z30.09 BIRTH CONTROL COUNSELING: ICD-10-CM

## 2024-09-27 LAB
25(OH)D3 SERPL-MCNC: 24 NG/ML (ref 30–100)
ALBUMIN SERPL BCP-MCNC: 4.3 G/DL (ref 3.2–4.9)
ALBUMIN/GLOB SERPL: 1.5 G/DL
ALP SERPL-CCNC: 158 U/L (ref 30–99)
ALT SERPL-CCNC: 47 U/L (ref 2–50)
ANION GAP SERPL CALC-SCNC: 11 MMOL/L (ref 7–16)
AST SERPL-CCNC: 33 U/L (ref 12–45)
BILIRUB SERPL-MCNC: 0.4 MG/DL (ref 0.1–1.5)
BUN SERPL-MCNC: 12 MG/DL (ref 8–22)
CALCIUM ALBUM COR SERPL-MCNC: 8.6 MG/DL (ref 8.5–10.5)
CALCIUM SERPL-MCNC: 8.8 MG/DL (ref 8.4–10.2)
CHLORIDE SERPL-SCNC: 104 MMOL/L (ref 96–112)
CHOLEST SERPL-MCNC: 167 MG/DL (ref 100–199)
CO2 SERPL-SCNC: 24 MMOL/L (ref 20–33)
CREAT SERPL-MCNC: 0.75 MG/DL (ref 0.5–1.4)
ERYTHROCYTE [DISTWIDTH] IN BLOOD BY AUTOMATED COUNT: 41.1 FL (ref 35.9–50)
EST. AVERAGE GLUCOSE BLD GHB EST-MCNC: 77 MG/DL
GFR SERPLBLD CREATININE-BSD FMLA CKD-EPI: 100 ML/MIN/1.73 M 2
GLOBULIN SER CALC-MCNC: 2.9 G/DL (ref 1.9–3.5)
GLUCOSE SERPL-MCNC: 86 MG/DL (ref 65–99)
HBA1C MFR BLD: 4.3 % (ref 4–5.6)
HCT VFR BLD AUTO: 40 % (ref 37–47)
HDLC SERPL-MCNC: 63 MG/DL
HGB BLD-MCNC: 13.7 G/DL (ref 12–16)
LDLC SERPL CALC-MCNC: 91 MG/DL
MCH RBC QN AUTO: 32.5 PG (ref 27–33)
MCHC RBC AUTO-ENTMCNC: 34.3 G/DL (ref 32.2–35.5)
MCV RBC AUTO: 95 FL (ref 81.4–97.8)
PLATELET # BLD AUTO: 297 K/UL (ref 164–446)
PMV BLD AUTO: 10.2 FL (ref 9–12.9)
POTASSIUM SERPL-SCNC: 4.1 MMOL/L (ref 3.6–5.5)
PROT SERPL-MCNC: 7.2 G/DL (ref 6–8.2)
RBC # BLD AUTO: 4.21 M/UL (ref 4.2–5.4)
SODIUM SERPL-SCNC: 139 MMOL/L (ref 135–145)
T4 FREE SERPL-MCNC: 1.2 NG/DL (ref 0.93–1.7)
TRIGL SERPL-MCNC: 63 MG/DL (ref 0–149)
TSH SERPL DL<=0.005 MIU/L-ACNC: 0.7 UIU/ML (ref 0.38–5.33)
WBC # BLD AUTO: 5.3 K/UL (ref 4.8–10.8)

## 2024-09-27 PROCEDURE — 87491 CHLMYD TRACH DNA AMP PROBE: CPT

## 2024-09-27 PROCEDURE — 83001 ASSAY OF GONADOTROPIN (FSH): CPT

## 2024-09-27 PROCEDURE — 86803 HEPATITIS C AB TEST: CPT

## 2024-09-27 PROCEDURE — 84481 FREE ASSAY (FT-3): CPT

## 2024-09-27 PROCEDURE — 84443 ASSAY THYROID STIM HORMONE: CPT

## 2024-09-27 PROCEDURE — 83002 ASSAY OF GONADOTROPIN (LH): CPT

## 2024-09-27 PROCEDURE — 36415 COLL VENOUS BLD VENIPUNCTURE: CPT

## 2024-09-27 PROCEDURE — 80061 LIPID PANEL: CPT

## 2024-09-27 PROCEDURE — 85027 COMPLETE CBC AUTOMATED: CPT

## 2024-09-27 PROCEDURE — 87591 N.GONORRHOEAE DNA AMP PROB: CPT

## 2024-09-27 PROCEDURE — 82306 VITAMIN D 25 HYDROXY: CPT

## 2024-09-27 PROCEDURE — 87389 HIV-1 AG W/HIV-1&-2 AB AG IA: CPT

## 2024-09-27 PROCEDURE — 84439 ASSAY OF FREE THYROXINE: CPT

## 2024-09-27 PROCEDURE — 83036 HEMOGLOBIN GLYCOSYLATED A1C: CPT

## 2024-09-27 PROCEDURE — 80053 COMPREHEN METABOLIC PANEL: CPT

## 2024-09-27 PROCEDURE — 82670 ASSAY OF TOTAL ESTRADIOL: CPT

## 2024-09-27 PROCEDURE — 84144 ASSAY OF PROGESTERONE: CPT

## 2024-09-27 PROCEDURE — 84146 ASSAY OF PROLACTIN: CPT

## 2024-09-28 LAB
C TRACH DNA SPEC QL NAA+PROBE: NEGATIVE
ESTRADIOL SERPL-MCNC: 20.8 PG/ML
FSH SERPL-ACNC: 25.6 MIU/ML
HCV AB SER QL: NONREACTIVE
HIV 1+2 AB+HIV1 P24 AG SERPL QL IA: NORMAL
LH SERPL-ACNC: 10.8 IU/L
N GONORRHOEA DNA SPEC QL NAA+PROBE: NEGATIVE
PROGEST SERPL-MCNC: 0.13 NG/ML
PROLACTIN SERPL-MCNC: 4.89 NG/ML (ref 2.8–26)
SPECIMEN SOURCE: NORMAL
T3FREE SERPL-MCNC: 2.87 PG/ML (ref 2–4.4)

## 2024-10-16 DIAGNOSIS — Z30.9 ENCOUNTER FOR CONTRACEPTIVE MANAGEMENT, UNSPECIFIED TYPE: ICD-10-CM

## 2024-10-16 RX ORDER — LEVONORGESTREL AND ETHINYL ESTRADIOL 90; 20 UG/1; UG/1
1 TABLET ORAL DAILY
Qty: 84 TABLET | Refills: 3 | Status: SHIPPED | OUTPATIENT
Start: 2024-10-16

## 2024-11-13 ENCOUNTER — TELEMEDICINE (OUTPATIENT)
Dept: MEDICAL GROUP | Facility: MEDICAL CENTER | Age: 44
End: 2024-11-13
Payer: COMMERCIAL

## 2024-11-13 VITALS — BODY MASS INDEX: 30 KG/M2 | HEIGHT: 62 IN

## 2024-11-13 DIAGNOSIS — Z30.09 BIRTH CONTROL COUNSELING: ICD-10-CM

## 2024-11-13 DIAGNOSIS — E55.9 VITAMIN D DEFICIENCY: ICD-10-CM

## 2024-11-13 DIAGNOSIS — Z71.2 ENCOUNTER TO DISCUSS TEST RESULTS: ICD-10-CM

## 2024-11-13 DIAGNOSIS — R74.8 ELEVATED ALKALINE PHOSPHATASE LEVEL: ICD-10-CM

## 2024-11-13 PROCEDURE — 99213 OFFICE O/P EST LOW 20 MIN: CPT | Mod: 95 | Performed by: NURSE PRACTITIONER

## 2024-11-13 ASSESSMENT — ENCOUNTER SYMPTOMS
PALPITATIONS: 0
CHILLS: 0
FEVER: 0

## 2024-11-13 NOTE — PROGRESS NOTES
Virtual Visit: Established Patient   This visit was conducted via Teams using secure and encrypted videoconferencing technology.   The patient was in their home in the [facility].    The patient's identity was confirmed and verbal consent was obtained for this virtual visit.   This evaluation was conducted via Teams using secure and encrypted videoconferencing technology. The patient was in their home in the [facility].    The patient's identity was confirmed and verbal consent was obtained for this virtual visit.     Patient agreed to using ERIK: yes    Rubi was seen today for lab results.    Diagnoses and all orders for this visit:    Birth control counseling    Encounter to discuss test results    Elevated alkaline phosphatase level    Vitamin D deficiency              Assessment & Plan  1. Elevated Alkaline Phosphatase.  Her alkaline phosphatase level is elevated at 158, decreased from previous value of 235. Liver enzymes are within normal range. Previously investigated with GI, benign liver etiology. Pt denies musculoskeletal, joint discomfort.    2. Vitamin D deficiency.  3. Test results  Chronic and stable condition.  Her vitamin D level is slightly low at 24. She is advised to take over-the-counter vitamin D supplements to support her immune system, mood, and bone health.    4. Birth Control Management.  She has started the birth control prescribed previously to skip her periods and reports no significant changes in how she feels. She has refills available and will continue the current regimen.    5. Health Maintenance.  A Pap smear is due and will be scheduled. She has a history of an abnormal Pap smear in 2018 related to HPV, but subsequent tests have been normal.        Subjective:   CC:   Chief Complaint   Patient presents with    Lab Results       History of Present Illness  The patient presents for lab review.    She has a history of elevated liver enzymes, which have been high for an  "extended period. Her GGT was previously elevated. She underwent several tests, including one for lupus, but did not consult a hematology specialist. Instead, she saw a GI specialist who performed a liver biopsy, which did not reveal any abnormalities.    She reports no musculoskeletal pain. She took meloxicam for shoulder pain in December 2023, but only for a week and has not taken it since.    She continues to menstruate and has started using birth control pills to manage her menstrual cycle. She has not noticed any significant changes since starting the birth control pills. She is unsure if her recent lab results were affected by her menstrual cycle.    She is not currently taking any vitamin D supplements.    She has not had a Pap smear recently, with her last one being in 2018, which was abnormal. She was pregnant at the time of the abnormal Pap smear, but subsequent tests after childbirth were normal. She has had one or two normal Pap smears since then.       Review of Systems   Constitutional:  Negative for chills, fever and malaise/fatigue.   Cardiovascular:  Negative for chest pain, palpitations and leg swelling.        Current medicines (including changes today)  Current Outpatient Medications   Medication Sig Dispense Refill    levonorgestrel-ethinyl estradiol (SRIDEVI) 90-20 MCG per tablet TAKE 1 TABLET BY MOUTH DAILY 84 Tablet 3     No current facility-administered medications for this visit.        Objective:   Ht 1.575 m (5' 2\")   BMI 30.00 kg/m²     Physical Exam:  Constitutional: Alert, no distress, well-groomed.  Skin: No rashes in visible areas.  Eye: Round. Conjunctiva clear, lids normal. No icterus.   ENMT: Lips pink without lesions, good dentition, moist mucous membranes. Phonation normal.  Neck: No masses, no thyromegaly. Moves freely without pain.  Respiratory: Unlabored respiratory effort, no cough or audible wheeze  Psych: Alert and oriented x3, normal affect and mood. "     Results  Laboratory Studies  Alkaline phosphate is elevated at 158. Vitamin D level is 24.       Discussed with patient possible alternative diagnoses, patient is to take all medications as prescribed.      If symptoms persist FU w/PCP, if symptoms worsen go to emergency room.      If experiencing any side effects from prescribed medications report to the office immediately or go to emergency room.     Reviewed indication, dosage, usage and potential adverse effects of prescribed medications.      Reviewed risks and benefits of treatment plan. Patient verbalizes understanding of all instruction and verbally agrees to plan.     Discussed plan with the patient, and patient agrees to the above.      I personally reviewed prior external notes and test results pertinent to today's visit.     No follow-ups on file. Annual, PRN

## 2025-01-14 DIAGNOSIS — Z30.9 ENCOUNTER FOR CONTRACEPTIVE MANAGEMENT, UNSPECIFIED TYPE: ICD-10-CM

## 2025-01-14 RX ORDER — LEVONORGESTREL AND ETHINYL ESTRADIOL 90; 20 UG/1; UG/1
1 TABLET ORAL DAILY
Qty: 84 TABLET | Refills: 3 | Status: SHIPPED | OUTPATIENT
Start: 2025-01-14